# Patient Record
Sex: FEMALE | Race: WHITE | NOT HISPANIC OR LATINO | URBAN - METROPOLITAN AREA
[De-identification: names, ages, dates, MRNs, and addresses within clinical notes are randomized per-mention and may not be internally consistent; named-entity substitution may affect disease eponyms.]

---

## 2018-03-30 ENCOUNTER — OFFICE (OUTPATIENT)
Dept: URBAN - METROPOLITAN AREA CLINIC 66 | Facility: CLINIC | Age: 63
End: 2018-03-30

## 2018-03-30 VITALS
DIASTOLIC BLOOD PRESSURE: 73 MMHG | SYSTOLIC BLOOD PRESSURE: 121 MMHG | HEIGHT: 62 IN | HEART RATE: 77 BPM | WEIGHT: 111 LBS

## 2018-03-30 DIAGNOSIS — K58.0 IRRITABLE BOWEL SYNDROME WITH DIARRHEA: ICD-10-CM

## 2018-03-30 DIAGNOSIS — K86.1 OTHER CHRONIC PANCREATITIS: ICD-10-CM

## 2018-03-30 PROCEDURE — 99213 OFFICE O/P EST LOW 20 MIN: CPT

## 2018-07-13 ENCOUNTER — OFFICE (OUTPATIENT)
Dept: URBAN - METROPOLITAN AREA CLINIC 66 | Facility: CLINIC | Age: 63
End: 2018-07-13

## 2018-07-13 VITALS
SYSTOLIC BLOOD PRESSURE: 125 MMHG | DIASTOLIC BLOOD PRESSURE: 79 MMHG | HEART RATE: 68 BPM | WEIGHT: 110 LBS | HEIGHT: 62 IN

## 2018-07-13 DIAGNOSIS — K30 FUNCTIONAL DYSPEPSIA: ICD-10-CM

## 2018-07-13 DIAGNOSIS — K58.0 IRRITABLE BOWEL SYNDROME WITH DIARRHEA: ICD-10-CM

## 2018-07-13 DIAGNOSIS — F41.8 OTHER SPECIFIED ANXIETY DISORDERS: ICD-10-CM

## 2018-07-13 PROCEDURE — 99214 OFFICE O/P EST MOD 30 MIN: CPT

## 2018-08-01 ENCOUNTER — OFFICE (OUTPATIENT)
Dept: URBAN - METROPOLITAN AREA CLINIC 66 | Facility: CLINIC | Age: 63
End: 2018-08-01

## 2018-08-01 VITALS
DIASTOLIC BLOOD PRESSURE: 81 MMHG | WEIGHT: 110 LBS | SYSTOLIC BLOOD PRESSURE: 155 MMHG | HEART RATE: 78 BPM | HEIGHT: 62 IN

## 2018-08-01 DIAGNOSIS — K30 FUNCTIONAL DYSPEPSIA: ICD-10-CM

## 2018-08-01 DIAGNOSIS — F41.8 OTHER SPECIFIED ANXIETY DISORDERS: ICD-10-CM

## 2018-08-01 DIAGNOSIS — R11.0 NAUSEA: ICD-10-CM

## 2018-08-01 DIAGNOSIS — R10.9 UNSPECIFIED ABDOMINAL PAIN: ICD-10-CM

## 2018-08-01 PROCEDURE — 99214 OFFICE O/P EST MOD 30 MIN: CPT

## 2018-08-15 ENCOUNTER — AMBULATORY SURGICAL CENTER (OUTPATIENT)
Dept: URBAN - METROPOLITAN AREA SURGERY 20 | Facility: SURGERY | Age: 63
End: 2018-08-15
Payer: COMMERCIAL

## 2018-08-15 ENCOUNTER — OFFICE (OUTPATIENT)
Dept: URBAN - METROPOLITAN AREA PATHOLOGY 4 | Facility: PATHOLOGY | Age: 63
End: 2018-08-15

## 2018-08-15 VITALS — HEIGHT: 62 IN

## 2018-08-15 DIAGNOSIS — K21.0 GASTRO-ESOPHAGEAL REFLUX DISEASE WITH ESOPHAGITIS: ICD-10-CM

## 2018-08-15 DIAGNOSIS — K30 FUNCTIONAL DYSPEPSIA: ICD-10-CM

## 2018-08-15 DIAGNOSIS — R10.9 UNSPECIFIED ABDOMINAL PAIN: ICD-10-CM

## 2018-08-15 DIAGNOSIS — K44.9 DIAPHRAGMATIC HERNIA WITHOUT OBSTRUCTION OR GANGRENE: ICD-10-CM

## 2018-08-15 LAB
GI HISTOLOGY: A. SELECT: (no result)
GI HISTOLOGY: B. SELECT: (no result)
GI HISTOLOGY: PDF REPORT: (no result)

## 2018-08-15 PROCEDURE — 88305 TISSUE EXAM BY PATHOLOGIST: CPT

## 2018-08-15 PROCEDURE — 43239 EGD BIOPSY SINGLE/MULTIPLE: CPT

## 2018-08-15 NOTE — SERVICEHPINOTES
ELAINA CHING  is a  62  female   who presents today for a  EGD   for   the indications listed below. The updated Patient Profile was reviewed prior to the procedure, in conjunction with the Physical Exam, including medical conditions, surgical procedures, medications, allergies, family history and social history. See Physical Exam time stamp below for date and time of HPI completion.Pre-operatively, I reviewed the indication(s) for the procedure, the risks of the procedure [including but not limited to: unexpected bleeding possibly requiring hospitalization and/or unplanned repeat procedures, perforation possibly requiring surgical treatment, missed lesions and complications of sedation/MAC (also explained by anesthesia staff)]. I have evaluated the patient for risks associated with the planned anesthesia and the procedure to be performed and find the patient an acceptable candidate for IV sedation.Multiple opportunities were provided for any questions or concerns, and all questions were answered satisfactorily before any anesthesia was administered. We will proceed with the planned procedure.BR

## 2018-08-23 ENCOUNTER — OFFICE (OUTPATIENT)
Dept: URBAN - METROPOLITAN AREA CLINIC 66 | Facility: CLINIC | Age: 63
End: 2018-08-23

## 2018-08-23 VITALS
DIASTOLIC BLOOD PRESSURE: 81 MMHG | HEIGHT: 62 IN | HEART RATE: 85 BPM | SYSTOLIC BLOOD PRESSURE: 129 MMHG | WEIGHT: 110 LBS

## 2018-08-23 DIAGNOSIS — F41.8 OTHER SPECIFIED ANXIETY DISORDERS: ICD-10-CM

## 2018-08-23 DIAGNOSIS — K82.4 CHOLESTEROLOSIS OF GALLBLADDER: ICD-10-CM

## 2018-08-23 DIAGNOSIS — R10.9 UNSPECIFIED ABDOMINAL PAIN: ICD-10-CM

## 2018-08-23 DIAGNOSIS — K58.0 IRRITABLE BOWEL SYNDROME WITH DIARRHEA: ICD-10-CM

## 2018-08-23 PROCEDURE — 99214 OFFICE O/P EST MOD 30 MIN: CPT

## 2018-08-26 PROBLEM — K44.9 DIAPHRAGMATIC HERNIA WITHOUT OBSTRUCTION OR GANGRENE: Status: ACTIVE | Noted: 2018-08-15

## 2020-12-18 ENCOUNTER — OFFICE VISIT (OUTPATIENT)
Dept: FAMILY MEDICINE CLINIC | Facility: CLINIC | Age: 65
End: 2020-12-18

## 2020-12-18 VITALS
TEMPERATURE: 97.5 F | BODY MASS INDEX: 22.08 KG/M2 | HEIGHT: 62 IN | HEART RATE: 60 BPM | OXYGEN SATURATION: 99 % | WEIGHT: 120 LBS

## 2020-12-18 DIAGNOSIS — E78.2 MIXED HYPERLIPIDEMIA: ICD-10-CM

## 2020-12-18 DIAGNOSIS — R74.8 ELEVATED PANCREATIC ENZYME: ICD-10-CM

## 2020-12-18 DIAGNOSIS — I10 BENIGN ESSENTIAL HTN: Primary | ICD-10-CM

## 2020-12-18 DIAGNOSIS — F41.9 ANXIETY: ICD-10-CM

## 2020-12-18 DIAGNOSIS — E03.9 ACQUIRED HYPOTHYROIDISM: ICD-10-CM

## 2020-12-18 DIAGNOSIS — R60.9 DEPENDENT EDEMA: ICD-10-CM

## 2020-12-18 PROCEDURE — 99204 OFFICE O/P NEW MOD 45 MIN: CPT | Performed by: NURSE PRACTITIONER

## 2020-12-18 RX ORDER — MULTIPLE VITAMINS W/ MINERALS TAB 9MG-400MCG
1 TAB ORAL DAILY
COMMUNITY

## 2020-12-18 RX ORDER — ESCITALOPRAM OXALATE 10 MG/1
10 TABLET ORAL DAILY
COMMUNITY
Start: 2020-10-15 | End: 2020-12-18 | Stop reason: SDUPTHER

## 2020-12-18 RX ORDER — LISINOPRIL 5 MG/1
TABLET ORAL
COMMUNITY
Start: 2020-10-18 | End: 2020-12-18

## 2020-12-18 RX ORDER — LISINOPRIL 2.5 MG/1
2.5 TABLET ORAL DAILY
Qty: 90 TABLET | Refills: 1 | Status: SHIPPED | OUTPATIENT
Start: 2020-12-18 | End: 2021-04-15

## 2020-12-18 RX ORDER — CETIRIZINE HYDROCHLORIDE 10 MG/1
10 TABLET ORAL DAILY
COMMUNITY
End: 2021-05-05

## 2020-12-18 RX ORDER — LEVOTHYROXINE SODIUM 25 UG/1
25 TABLET ORAL EVERY MORNING
COMMUNITY
Start: 2020-10-18

## 2020-12-18 RX ORDER — ESCITALOPRAM OXALATE 10 MG/1
10 TABLET ORAL DAILY
Qty: 90 TABLET | Refills: 1 | Status: SHIPPED | OUTPATIENT
Start: 2020-12-18 | End: 2021-04-20 | Stop reason: SDUPTHER

## 2020-12-18 RX ORDER — LIOTHYRONINE SODIUM 5 UG/1
5 TABLET ORAL DAILY
COMMUNITY
Start: 2020-12-05

## 2020-12-18 RX ORDER — HYDROCHLOROTHIAZIDE 12.5 MG/1
12.5 CAPSULE, GELATIN COATED ORAL DAILY
COMMUNITY
End: 2022-02-14 | Stop reason: SDUPTHER

## 2020-12-18 RX ORDER — LORAZEPAM 0.5 MG/1
0.5 TABLET ORAL EVERY 8 HOURS PRN
COMMUNITY
End: 2020-12-18

## 2020-12-18 NOTE — PROGRESS NOTES
"Subjective   Lissy Fernandez is a 65 y.o. female.     Very pleasant patient here today for the first time needs new PCP  Her previous PCP Dr. Pang recently retired who she had seen for at least 40 years.  She has been doing well  Presently.  Healthy.  History of IBS food intolerances after consulting with a dietitian, she found a better dietary regimen and she has been doing quite well her weight has been down to 90 pounds, presently 120.  She avoids a list of food, and she is doing quite well presently she does not have true gluten intolerance celiac nonetheless she does not tolerate it and avoids it avoids beans etc.    Low thyroid    Hot flashes since    Just had labs        Surgical history  Ovaries and tubes removed approximately 2006 with some precancerous cells of her ovary she had no ovarian cancer but nonetheless she followed up with oncology for at least 5 years.  Still has her wound.  She had breast biopsies negative  Colonoscopy 2017 EGD 2018  Cholecystectomy 2018    SH  No tobacco, no drugs no alcohol  , 4 children, 9 grandkids       Family history  Sister bilateral breast cancer  No colon cancer             Pulse 60   Temp 97.5 °F (36.4 °C) (Temporal)   Ht 157.5 cm (62\")   Wt 54.4 kg (120 lb)   SpO2 99%   BMI 21.95 kg/m²       The following portions of the patient's history were reviewed and updated as appropriate: allergies, current medications, past family history, past medical history, past social history, past surgical history and problem list.    Review of Systems   Constitutional: Negative for chills, fatigue, fever and unexpected weight loss.   HENT: Negative.  Negative for trouble swallowing.    Eyes: Negative.    Respiratory: Negative for cough and shortness of breath.    Cardiovascular: Negative for chest pain, palpitations and leg swelling.   Gastrointestinal: Negative for abdominal pain and blood in stool.   Genitourinary: Negative.  Negative for pelvic pain. "   Musculoskeletal: Negative.    Skin: Negative.    Neurological: Negative.  Negative for dizziness, speech difficulty, weakness and confusion.   Psychiatric/Behavioral: Negative.        Objective   Physical Exam  Vitals signs reviewed.   Constitutional:       Appearance: Normal appearance. She is well-developed.   HENT:      Head: Normocephalic.      Right Ear: Tympanic membrane normal.      Left Ear: Tympanic membrane normal.      Mouth/Throat:      Pharynx: No oropharyngeal exudate.   Eyes:      Conjunctiva/sclera: Conjunctivae normal.      Pupils: Pupils are equal, round, and reactive to light.   Neck:      Musculoskeletal: Neck supple.      Vascular: No JVD.   Cardiovascular:      Rate and Rhythm: Normal rate and regular rhythm.      Heart sounds: Normal heart sounds. No murmur. No friction rub.   Pulmonary:      Effort: Pulmonary effort is normal. No respiratory distress.      Breath sounds: Normal breath sounds. No wheezing.   Abdominal:      General: Bowel sounds are normal. There is no distension.      Palpations: Abdomen is soft. There is no mass.      Tenderness: There is no abdominal tenderness. There is no guarding.      Hernia: No hernia is present.   Musculoskeletal: Normal range of motion.         General: No tenderness.   Lymphadenopathy:      Cervical: No cervical adenopathy.   Skin:     General: Skin is warm and dry.   Neurological:      Mental Status: She is alert and oriented to person, place, and time.      Sensory: No sensory deficit.      Coordination: Coordination normal.      Deep Tendon Reflexes: Reflexes normal.   Psychiatric:         Mood and Affect: Mood normal.         Behavior: Behavior normal.         Thought Content: Thought content normal.         Judgment: Judgment normal.           Assessment/Plan   Diagnoses and all orders for this visit:    1. Benign essential HTN (Primary)  Comments:  Mild    2. Dependent edema  -     CBC & Differential  -     Comprehensive Metabolic Panel  -      Lipid Panel With LDL / HDL Ratio  -     Urinalysis With Microscopic If Indicated (No Culture) - Urine, Clean Catch  -     Amylase  -     Lipase    3. Acquired hypothyroidism  -     CBC & Differential  -     Comprehensive Metabolic Panel  -     Lipid Panel With LDL / HDL Ratio  -     Urinalysis With Microscopic If Indicated (No Culture) - Urine, Clean Catch  -     Amylase  -     Lipase    4. Anxiety  -     CBC & Differential  -     Comprehensive Metabolic Panel  -     Lipid Panel With LDL / HDL Ratio  -     Urinalysis With Microscopic If Indicated (No Culture) - Urine, Clean Catch  -     Amylase  -     Lipase    5. Mixed hyperlipidemia  -     CBC & Differential  -     Comprehensive Metabolic Panel  -     Lipid Panel With LDL / HDL Ratio  -     Urinalysis With Microscopic If Indicated (No Culture) - Urine, Clean Catch  -     Amylase  -     Lipase    6. Elevated pancreatic enzyme  Comments:  Mild, probably no clinical significance will repeat  Orders:  -     CBC & Differential  -     Comprehensive Metabolic Panel  -     Lipid Panel With LDL / HDL Ratio  -     Urinalysis With Microscopic If Indicated (No Culture) - Urine, Clean Catch  -     Amylase  -     Lipase    Other orders  -     lisinopril (Zestril) 2.5 MG tablet; Take 1 tablet by mouth Daily.  Dispense: 90 tablet; Refill: 1  -     escitalopram (LEXAPRO) 10 MG tablet; Take 1 tablet by mouth Daily.  Dispense: 90 tablet; Refill: 1      Patient will continue present plan medications as written labs healthy diet regular exercise follow instructions below            There are no Patient Instructions on file for this visit.

## 2020-12-18 NOTE — PATIENT INSTRUCTIONS
Discharge instructions  Continue healthy diet regular exercise  Continue your special diet  Mostly vegetables  Mostly chicken fish 64 ounces of water a day      Continue present care  Hold thyroid medication x24 hours then resume levothyroxine 25 mcg daily but hold the liothyronine 5 mg, until your appointment Tuesday then they will set the dose for you thereafter

## 2020-12-30 ENCOUNTER — TELEPHONE (OUTPATIENT)
Dept: FAMILY MEDICINE CLINIC | Facility: CLINIC | Age: 65
End: 2020-12-30

## 2020-12-30 NOTE — TELEPHONE ENCOUNTER
I will be happy to look at those, I do not see them in the chart I am guessing the fax is still pending thank you

## 2020-12-30 NOTE — TELEPHONE ENCOUNTER
Pt has been called and advised you are not in office at this time and will return next week and you check your messages sporadically. She is calling [facility] to get labs faxed over.

## 2020-12-31 NOTE — TELEPHONE ENCOUNTER
Pt has been called and advised we are waiting on her blood work from [facility] and we will be more then happy to call her with results.

## 2021-01-07 ENCOUNTER — TELEPHONE (OUTPATIENT)
Dept: FAMILY MEDICINE CLINIC | Facility: CLINIC | Age: 66
End: 2021-01-07

## 2021-01-07 NOTE — TELEPHONE ENCOUNTER
Patient called in requesting the results from the lab work she had done.    Please call back to danie @ [phone]

## 2021-01-22 DIAGNOSIS — R74.8 ELEVATED PANCREATIC ENZYME: ICD-10-CM

## 2021-01-22 DIAGNOSIS — R74.8 SERUM AMYLASE ELEVATED: Primary | ICD-10-CM

## 2021-01-22 NOTE — TELEPHONE ENCOUNTER
Office note, for abdominal ultrasound      Discussed with patient her labs overall everything looks pretty good she has a very low 10-year cardiovascular risk around 7%    She has seen endocrinology, had a few palpitations they were mild  No racing heart shortness of breath syncope weakness dizziness etc. there are change in her thyroid medication and both her and the doctor think it is likely related    Should she have increased symptoms chest pain shortness of breath weakness emergency room any more frequent palpitations not resolving  She should have a Holter monitor and see cardiology      She has chronic mild elevation amylase most recently 175 she is without abdominal complaints does not abuse alcohol she had a gallbladder removed 2 years ago related to gallbladder complaints of abdominal pain and dyspepsia  This resolved after the surgery nonetheless she had a chronic elevated amylase, she is not sure if she had further evaluation or if she had an ultrasound or CAT scan.    Reviewed her medication list I will see anything that would elevate amylase chronically artificially  No tobacco or family history of pancreatic cancers    I think at the minimum we should check a ultrasound to make sure everything looks good with the pancreas and Duct    Offered gastroenterologist referral as well    Patient would like to start with the ultrasound I think is a good plan as well to rule out any ductal obstruction or pancreatic mass etc.      James Epley, APRN, FNP

## 2021-01-22 NOTE — TELEPHONE ENCOUNTER
Pt called checking on the status of this. We still have not received these results. I called [facility] again and they said they would fax them over today.

## 2021-01-22 NOTE — TELEPHONE ENCOUNTER
We finally received the lab results. I have routed them to James Epley. If Edgardo could review and then we call the patient. She has been trying to get these results for almost a month now.

## 2021-04-15 RX ORDER — LISINOPRIL 2.5 MG/1
TABLET ORAL
Qty: 90 TABLET | Refills: 1 | Status: SHIPPED | OUTPATIENT
Start: 2021-04-15 | End: 2021-06-11

## 2021-04-20 RX ORDER — ESCITALOPRAM OXALATE 10 MG/1
10 TABLET ORAL DAILY
Qty: 90 TABLET | Refills: 0 | Status: SHIPPED | OUTPATIENT
Start: 2021-04-20 | End: 2021-07-09

## 2021-04-20 NOTE — TELEPHONE ENCOUNTER
Caller: Lissy Fernandez    Relationship: Self    Best call back number: [phone]    Medication needed:   Requested Prescriptions     Pending Prescriptions Disp Refills   • escitalopram (LEXAPRO) 10 MG tablet 90 tablet 1     Sig: Take 1 tablet by mouth Daily.       When do you need the refill by: 02/22/21    What additional details did the patient provide when requesting the medication: THE PATIENT STATES THAT SHE HAS BOUT 5 DAYS OF THE PILLS AND WILL BE LEAVING [place] TO COME BACK TO [place] IN TWO DAYS AND WOULD LIKE T O HAVE THIS BY THIS Thursday OR Friday 02/22-23/21.     Does the patient have less than a 3 day supply:  [] Yes  [x] No    What is the patient's preferred pharmacy: [facility] [address] [place] [address] [phone] [facility] [phone]

## 2021-05-05 ENCOUNTER — APPOINTMENT (OUTPATIENT)
Dept: WOMENS IMAGING | Facility: HOSPITAL | Age: 66
End: 2021-05-05

## 2021-05-05 ENCOUNTER — PROCEDURE VISIT (OUTPATIENT)
Dept: OBSTETRICS AND GYNECOLOGY | Facility: CLINIC | Age: 66
End: 2021-05-05

## 2021-05-05 ENCOUNTER — OFFICE VISIT (OUTPATIENT)
Dept: OBSTETRICS AND GYNECOLOGY | Facility: CLINIC | Age: 66
End: 2021-05-05

## 2021-05-05 VITALS
SYSTOLIC BLOOD PRESSURE: 100 MMHG | WEIGHT: 118.8 LBS | HEIGHT: 62 IN | BODY MASS INDEX: 21.86 KG/M2 | DIASTOLIC BLOOD PRESSURE: 60 MMHG

## 2021-05-05 DIAGNOSIS — Z01.419 ENCOUNTER FOR GYNECOLOGICAL EXAMINATION WITHOUT ABNORMAL FINDING: Primary | ICD-10-CM

## 2021-05-05 DIAGNOSIS — D39.10 OVARIAN NEOPLASM WITH LOW MALIGNANT POTENTIAL: ICD-10-CM

## 2021-05-05 DIAGNOSIS — Z12.31 VISIT FOR SCREENING MAMMOGRAM: Primary | ICD-10-CM

## 2021-05-05 PROCEDURE — G0101 CA SCREEN;PELVIC/BREAST EXAM: HCPCS | Performed by: OBSTETRICS & GYNECOLOGY

## 2021-05-05 PROCEDURE — 77063 BREAST TOMOSYNTHESIS BI: CPT | Performed by: OBSTETRICS & GYNECOLOGY

## 2021-05-05 PROCEDURE — 77063 BREAST TOMOSYNTHESIS BI: CPT | Performed by: RADIOLOGY

## 2021-05-05 PROCEDURE — 77067 SCR MAMMO BI INCL CAD: CPT | Performed by: OBSTETRICS & GYNECOLOGY

## 2021-05-05 PROCEDURE — 77067 SCR MAMMO BI INCL CAD: CPT | Performed by: RADIOLOGY

## 2021-05-05 RX ORDER — IPRATROPIUM BROMIDE 42 UG/1
1 SPRAY, METERED NASAL AS NEEDED
COMMUNITY
Start: 2021-02-22

## 2021-05-05 NOTE — PROGRESS NOTES
GYN Annual Exam     CC- Here for annual exam.     Lissy Fernandez is a 65 y.o. female who presents for annual well woman exam. She IS/ IS NOT: is menopausal.  She is experiencing and/or reports none.  She denies postmenopausal vaginal bleeding.  She denies abdominal pain, diarrhea and cough.  Today, she wishes to specifically address nothing in particular.  I explained to her that current ACOG guidelines state that screening Pap smears are no longer recommended after the age of 65, nor after hysterectomy for benign reasons.  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  She has had negative CA-125's through her last visit.  She is a former patient of mine who I last saw in 2018.  Her CA-125 was 6.9 on 2018.    OB History        2    Para   2    Term                AB        Living           SAB        TAB        Ectopic        Molar        Multiple        Live Births                    Current contraception: post menopausal status  History of abnormal Pap smear: no  Family history of uterine, colon or ovarian cancer: no  History of abnormal mammogram: no  Family history of breast cancer: yes - Sister  Last Pap :   Last mammogram: 2019  Last colonoscopy: In the past 10 years  Last DEXA: Cannot locate      Past Medical History:   Diagnosis Date   • Abnormal Pap smear of cervix    • Cervical dysplasia    • HTN (hypertension)    • Hypothyroidism    • Melanoma (CMS/HCC)        Past Surgical History:   Procedure Laterality Date   • BILATERAL OOPHORECTOMY     • CHOLECYSTECTOMY     • WISDOM TOOTH EXTRACTION           Current Outpatient Medications:   •  escitalopram (LEXAPRO) 10 MG tablet, Take 1 tablet by mouth Daily., Disp: 90 tablet, Rfl: 0  •  Euthyrox 25 MCG tablet, Take 25 mcg by mouth Every Morning., Disp: , Rfl:   •  ipratropium (ATROVENT) 0.06 % nasal spray, 1 spray As Needed., Disp: , Rfl:   •  liothyronine (CYTOMEL) 5 MCG tablet,  "Take 5 mcg by mouth Daily., Disp: , Rfl:   •  lisinopril (PRINIVIL,ZESTRIL) 2.5 MG tablet, Take 1 tablet by mouth once daily, Disp: 90 tablet, Rfl: 1  •  multivitamin with minerals tablet tablet, Take 1 tablet by mouth Daily., Disp: , Rfl:   •  hydroCHLOROthiazide (MICROZIDE) 12.5 MG capsule, Take 12.5 mg by mouth Daily., Disp: , Rfl:     Allergies   Allergen Reactions   • Amoxicillin-Pot Clavulanate Diarrhea and Nausea And Vomiting   • Azithromycin Diarrhea and GI Intolerance   • Levofloxacin Diarrhea, Nausea And Vomiting and GI Intolerance   • Metronidazole GI Intolerance       Social History     Tobacco Use   • Smoking status: Never Smoker   • Smokeless tobacco: Never Used   Vaping Use   • Vaping Use: Never used   Substance Use Topics   • Alcohol use: Never   • Drug use: Never       Family History   Problem Relation Age of Onset   • Hypertension Father    • Breast cancer Sister    • Breast cancer Other        Review of Systems   Constitutional: Negative for fever and unexpected weight loss.   Respiratory: Negative for cough and shortness of breath.    Genitourinary: Negative for decreased urine volume, pelvic pain, urinary incontinence and vaginal bleeding.   Patient reports that she is not currently experiencing any symptoms of urinary incontinence.      /60   Ht 157.5 cm (62.01\")   Wt 53.9 kg (118 lb 12.8 oz)   LMP  (LMP Unknown)   BMI 21.72 kg/m²     Physical Exam  Constitutional:       Appearance: She is normal weight.   Genitourinary:      Pelvic exam was performed with patient in the lithotomy position.      Vulva, inguinal canal, urethra and bladder normal.      Bladder is not tender.      No lesions in the vagina.      No cervical discharge, lesion or bleeding.      Uterus is mobile.      Uterus is midaxial and regular.      Right adnexa absent.      Left adnexa absent.   Neck:      Thyroid: No thyroid mass or thyromegaly.   Chest:      Breasts:         Right: No mass, nipple discharge, skin " change or tenderness.         Left: No mass, nipple discharge, skin change or tenderness.   Abdominal:      General: Abdomen is flat. There are no signs of injury.      Palpations: There is no mass.      Tenderness: There is no abdominal tenderness.   Neurological:      Mental Status: She is alert.   Vitals reviewed.              Assessment     1) GYN annual well woman exam.   2) history of bilateral salpingo-oophorectomy for ovarian tumor of low malignant potential.  We will get CA-125 today.  He is doing well with no evidence of any kind of recurrent disease.     Plan     1) Breast Health - Clinical breast exam & mammogram reviewed specifically American Cancer Society recommendations for screening specific to her, and Self breast awareness monthly  2) Pap -done today  3) Smoking status-negative  4) Colon health - screening colonoscopy recommended if not up to date  5) Bone health - Weight bearing exercise, dietary calcium recommendations and vitamin D reviewed.   6) Encouraged to be wary of information obtained via social media and internet based on source and search.   7) Follow up prn and one year      Allan Lopez MD   5/5/2021  11:50 EDT

## 2021-05-06 LAB — CANCER AG125 SERPL-ACNC: 11.8 U/ML (ref 0–38.1)

## 2021-05-07 LAB
CONV .: NORMAL
CYTOLOGIST CVX/VAG CYTO: NORMAL
CYTOLOGY CVX/VAG DOC CYTO: NORMAL
CYTOLOGY CVX/VAG DOC THIN PREP: NORMAL
DX ICD CODE: NORMAL
HIV 1 & 2 AB SER-IMP: NORMAL
OTHER STN SPEC: NORMAL
STAT OF ADQ CVX/VAG CYTO-IMP: NORMAL

## 2021-06-03 ENCOUNTER — OFFICE (OUTPATIENT)
Dept: URBAN - METROPOLITAN AREA CLINIC 94 | Facility: CLINIC | Age: 66
End: 2021-06-03

## 2021-06-03 VITALS
DIASTOLIC BLOOD PRESSURE: 66 MMHG | TEMPERATURE: 97.3 F | SYSTOLIC BLOOD PRESSURE: 118 MMHG | HEIGHT: 62 IN | WEIGHT: 120 LBS

## 2021-06-03 DIAGNOSIS — Z12.11 ENCOUNTER FOR SCREENING FOR MALIGNANT NEOPLASM OF COLON: ICD-10-CM

## 2021-06-03 DIAGNOSIS — K58.0 IRRITABLE BOWEL SYNDROME WITH DIARRHEA: ICD-10-CM

## 2021-06-03 DIAGNOSIS — R74.8 ABNORMAL LEVELS OF OTHER SERUM ENZYMES: ICD-10-CM

## 2021-06-03 DIAGNOSIS — R10.9 UNSPECIFIED ABDOMINAL PAIN: ICD-10-CM

## 2021-06-03 PROCEDURE — 99213 OFFICE O/P EST LOW 20 MIN: CPT | Performed by: INTERNAL MEDICINE

## 2021-06-03 RX ORDER — DICYCLOMINE HYDROCHLORIDE 10 MG/1
30 CAPSULE ORAL
Qty: 90 | Refills: 5 | Status: ACTIVE
Start: 2021-06-03

## 2021-06-11 RX ORDER — LISINOPRIL 2.5 MG/1
TABLET ORAL
Qty: 90 TABLET | Refills: 0 | Status: SHIPPED | OUTPATIENT
Start: 2021-06-11 | End: 2021-06-15

## 2021-06-15 ENCOUNTER — OFFICE VISIT (OUTPATIENT)
Dept: FAMILY MEDICINE CLINIC | Facility: CLINIC | Age: 66
End: 2021-06-15

## 2021-06-15 ENCOUNTER — LAB (OUTPATIENT)
Dept: LAB | Facility: HOSPITAL | Age: 66
End: 2021-06-15

## 2021-06-15 VITALS
HEART RATE: 63 BPM | SYSTOLIC BLOOD PRESSURE: 108 MMHG | DIASTOLIC BLOOD PRESSURE: 70 MMHG | TEMPERATURE: 97.1 F | WEIGHT: 119.1 LBS | HEIGHT: 62 IN | OXYGEN SATURATION: 96 % | BODY MASS INDEX: 21.92 KG/M2

## 2021-06-15 DIAGNOSIS — C44.300 SKIN CANCER OF FACE: ICD-10-CM

## 2021-06-15 DIAGNOSIS — H91.92 HEARING LOSS OF LEFT EAR, UNSPECIFIED HEARING LOSS TYPE: ICD-10-CM

## 2021-06-15 DIAGNOSIS — R74.8 ELEVATED AMYLASE: ICD-10-CM

## 2021-06-15 DIAGNOSIS — H69.80 DYSFUNCTION OF EUSTACHIAN TUBE, UNSPECIFIED LATERALITY: ICD-10-CM

## 2021-06-15 DIAGNOSIS — H69.83 EUSTACHIAN TUBE DYSFUNCTION, BILATERAL: ICD-10-CM

## 2021-06-15 DIAGNOSIS — E03.8 OTHER SPECIFIED HYPOTHYROIDISM: ICD-10-CM

## 2021-06-15 DIAGNOSIS — I10 ESSENTIAL HYPERTENSION: Primary | ICD-10-CM

## 2021-06-15 PROBLEM — H69.93 EUSTACHIAN TUBE DYSFUNCTION, BILATERAL: Status: ACTIVE | Noted: 2021-06-15

## 2021-06-15 LAB
ALBUMIN SERPL-MCNC: 4.3 G/DL (ref 3.5–5.2)
ALBUMIN/GLOB SERPL: 1.5 G/DL
ALP SERPL-CCNC: 89 U/L (ref 39–117)
ALT SERPL W P-5'-P-CCNC: 15 U/L (ref 1–33)
AMYLASE SERPL-CCNC: 174 U/L (ref 28–100)
ANION GAP SERPL CALCULATED.3IONS-SCNC: 10.9 MMOL/L (ref 5–15)
AST SERPL-CCNC: 24 U/L (ref 1–32)
BASOPHILS # BLD AUTO: 0.04 10*3/MM3 (ref 0–0.2)
BASOPHILS NFR BLD AUTO: 0.6 % (ref 0–1.5)
BILIRUB SERPL-MCNC: 0.5 MG/DL (ref 0–1.2)
BUN SERPL-MCNC: 15 MG/DL (ref 8–23)
BUN/CREAT SERPL: 16.1 (ref 7–25)
CALCIUM SPEC-SCNC: 9.6 MG/DL (ref 8.6–10.5)
CHLORIDE SERPL-SCNC: 103 MMOL/L (ref 98–107)
CHOLEST SERPL-MCNC: 236 MG/DL (ref 0–200)
CO2 SERPL-SCNC: 26.1 MMOL/L (ref 22–29)
CREAT SERPL-MCNC: 0.93 MG/DL (ref 0.57–1)
DEPRECATED RDW RBC AUTO: 43.9 FL (ref 37–54)
EOSINOPHIL # BLD AUTO: 0.19 10*3/MM3 (ref 0–0.4)
EOSINOPHIL NFR BLD AUTO: 2.9 % (ref 0.3–6.2)
ERYTHROCYTE [DISTWIDTH] IN BLOOD BY AUTOMATED COUNT: 12.9 % (ref 12.3–15.4)
GFR SERPL CREATININE-BSD FRML MDRD: 60 ML/MIN/1.73
GLOBULIN UR ELPH-MCNC: 2.8 GM/DL
GLUCOSE SERPL-MCNC: 83 MG/DL (ref 65–99)
HCT VFR BLD AUTO: 43.5 % (ref 34–46.6)
HDLC SERPL-MCNC: 75 MG/DL (ref 40–60)
HGB BLD-MCNC: 13.9 G/DL (ref 12–15.9)
IMM GRANULOCYTES # BLD AUTO: 0.01 10*3/MM3 (ref 0–0.05)
IMM GRANULOCYTES NFR BLD AUTO: 0.2 % (ref 0–0.5)
LDLC SERPL CALC-MCNC: 145 MG/DL (ref 0–100)
LDLC/HDLC SERPL: 1.9 {RATIO}
LYMPHOCYTES # BLD AUTO: 1.97 10*3/MM3 (ref 0.7–3.1)
LYMPHOCYTES NFR BLD AUTO: 29.7 % (ref 19.6–45.3)
MCH RBC QN AUTO: 29.8 PG (ref 26.6–33)
MCHC RBC AUTO-ENTMCNC: 32 G/DL (ref 31.5–35.7)
MCV RBC AUTO: 93.1 FL (ref 79–97)
MONOCYTES # BLD AUTO: 0.52 10*3/MM3 (ref 0.1–0.9)
MONOCYTES NFR BLD AUTO: 7.8 % (ref 5–12)
NEUTROPHILS NFR BLD AUTO: 3.91 10*3/MM3 (ref 1.7–7)
NEUTROPHILS NFR BLD AUTO: 58.8 % (ref 42.7–76)
NRBC BLD AUTO-RTO: 0 /100 WBC (ref 0–0.2)
PLATELET # BLD AUTO: 285 10*3/MM3 (ref 140–450)
PMV BLD AUTO: 10.1 FL (ref 6–12)
POTASSIUM SERPL-SCNC: 3.9 MMOL/L (ref 3.5–5.2)
PROT SERPL-MCNC: 7.1 G/DL (ref 6–8.5)
RBC # BLD AUTO: 4.67 10*6/MM3 (ref 3.77–5.28)
SODIUM SERPL-SCNC: 140 MMOL/L (ref 136–145)
TRIGL SERPL-MCNC: 91 MG/DL (ref 0–150)
VLDLC SERPL-MCNC: 16 MG/DL (ref 5–40)
WBC # BLD AUTO: 6.64 10*3/MM3 (ref 3.4–10.8)

## 2021-06-15 PROCEDURE — 99213 OFFICE O/P EST LOW 20 MIN: CPT | Performed by: NURSE PRACTITIONER

## 2021-06-15 PROCEDURE — 80061 LIPID PANEL: CPT | Performed by: NURSE PRACTITIONER

## 2021-06-15 PROCEDURE — 82150 ASSAY OF AMYLASE: CPT | Performed by: NURSE PRACTITIONER

## 2021-06-15 PROCEDURE — 80053 COMPREHEN METABOLIC PANEL: CPT | Performed by: NURSE PRACTITIONER

## 2021-06-15 PROCEDURE — 85025 COMPLETE CBC W/AUTO DIFF WBC: CPT | Performed by: NURSE PRACTITIONER

## 2021-06-15 PROCEDURE — 36415 COLL VENOUS BLD VENIPUNCTURE: CPT | Performed by: NURSE PRACTITIONER

## 2021-06-15 RX ORDER — LEVOTHYROXINE SODIUM 0.03 MG/1
25 TABLET ORAL DAILY
COMMUNITY

## 2021-06-15 RX ORDER — FLUTICASONE PROPIONATE 50 MCG
2 SPRAY, SUSPENSION (ML) NASAL DAILY
Qty: 18.2 ML | Refills: 11 | Status: SHIPPED | OUTPATIENT
Start: 2021-06-15

## 2021-06-15 NOTE — PATIENT INSTRUCTIONS
Discharge instructions    Flonase 2 sprays each nostril nightly  Trial of Zyrtec 10 mg at bedtime    Try this for 8 to 12 weeks  If needed add Astelin antihistamine nasal spray or over-the-counter [email or web address] nasal chrome for nasal allergies left    If your symptoms have not resolved over the next 8 to 12 weeks after giving this another time then see ENT for further evaluation simply call for referral    Left hip should you have frequent or chronic pain you need a work-up including x-rays otherwise just good stretching of your pelvis, buttocks,  And lower trunk region including your hip daily    Exercise joint sparing such as swimming cycling are excellent    Discontinue lisinopril  Check blood pressure weekly should average less than 130/80 and greater than 110/77 some blood pressure readings in a couple weeks    For occasional dependent edema simply elevate legs when sitting decrease sodium in your diet breads and pasta and compression socks on in the morning off at night 15-19 or similar something coffee during winter

## 2021-06-15 NOTE — PROGRESS NOTES
"Chief Complaint  Hypertension    Subjective          Lissy Fernandez presents to [facility]  Very pleasant patient here follow-up of hypertension essential patient's blood pressures been running pretty good 108/70 here she checks at home with a wrist cuff, usually controlled but sometimes little high in the 130s, she is having no chest pain shortness of breath no exertional chest pain.  She is down to 2.5 mg lisinopril will likely discontinue this today.  Hypothyroid acquired she is up-to-date with endocrinology her medications been adjusted.  Sometimes her friends notices some edema or swelling around her ankle she has no calf pain no chest pain shortness of breath no history of DVT PE, no acute problems otherwise no history of CHF.      Chronically left hearing loss deafness left ear with chronic tinnitus work-up 20 years ago negative MRI.  No change.  She does have some pressure issues makes the tinnitus a little worse at night, sometimes some pressure in her right ear as well.  She takes Allegra for allergies, but sometimes takes Benadryl  As a change in nothing for at least a certain extent helps her sleep little bit better through the tendinitis as well as sometimes the tendinitis is worse due to the pressure issues of her sinus.                Hypertension        Objective   Vital Signs:   /70   Pulse 63   Temp 97.1 °F (36.2 °C) (Temporal)   Ht 157.5 cm (62.01\")   Wt 54 kg (119 lb 1.6 oz)   SpO2 96%   BMI 21.78 kg/m²     Physical Exam  Vitals reviewed.   Constitutional:       Appearance: She is well-developed.   HENT:      Head: Normocephalic.      Nose: Nose normal.   Eyes:      General: No scleral icterus.     Conjunctiva/sclera: Conjunctivae normal.      Pupils: Pupils are equal, round, and reactive to light.   Neck:      Thyroid: No thyromegaly.      Vascular: No JVD.   Cardiovascular:      Rate and Rhythm: Normal rate and regular rhythm.      Heart sounds: " Normal heart sounds. No murmur heard.   No friction rub. No gallop.    Pulmonary:      Effort: Pulmonary effort is normal. No respiratory distress.      Breath sounds: Normal breath sounds. No stridor. No wheezing or rales.   Abdominal:      General: Bowel sounds are normal. There is no distension.      Palpations: Abdomen is soft.      Tenderness: There is no abdominal tenderness.      Comments: No hepatosplenomegaly, no ascites,   Musculoskeletal:         General: No tenderness.      Cervical back: Neck supple.   Lymphadenopathy:      Cervical: No cervical adenopathy.   Skin:     General: Skin is warm and dry.      Findings: No erythema or rash.   Neurological:      Mental Status: She is alert and oriented to person, place, and time.      Deep Tendon Reflexes: Reflexes are normal and symmetric.   Psychiatric:         Behavior: Behavior normal.         Thought Content: Thought content normal.         Judgment: Judgment normal.        Result Review :                 Assessment and Plan    Diagnoses and all orders for this visit:    1. Essential hypertension (Primary)  -     Comprehensive Metabolic Panel  -     CBC & Differential  -     Lipid Panel With LDL / HDL Ratio  -     Urinalysis With Microscopic If Indicated (No Culture) - Urine, Clean Catch  -     Amylase    2. Dysfunction of Eustachian tube, unspecified laterality  -     Comprehensive Metabolic Panel  -     CBC & Differential  -     Lipid Panel With LDL / HDL Ratio  -     Urinalysis With Microscopic If Indicated (No Culture) - Urine, Clean Catch  -     Amylase    3. Eustachian tube dysfunction, bilateral  -     Comprehensive Metabolic Panel  -     CBC & Differential  -     Lipid Panel With LDL / HDL Ratio  -     Urinalysis With Microscopic If Indicated (No Culture) - Urine, Clean Catch  -     Amylase    4. Hearing loss of left ear, unspecified hearing loss type  Comments:  Chronic no change greater than 20 years previous MRI work-up ENT many years ago  chronic tinnitus  Orders:  -     Comprehensive Metabolic Panel  -     CBC & Differential  -     Lipid Panel With LDL / HDL Ratio  -     Urinalysis With Microscopic If Indicated (No Culture) - Urine, Clean Catch  -     Amylase    5. Other specified hypothyroidism  -     Comprehensive Metabolic Panel  -     CBC & Differential  -     Lipid Panel With LDL / HDL Ratio  -     Urinalysis With Microscopic If Indicated (No Culture) - Urine, Clean Catch  -     Amylase    6. Elevated amylase  -     Comprehensive Metabolic Panel  -     CBC & Differential  -     Lipid Panel With LDL / HDL Ratio  -     Urinalysis With Microscopic If Indicated (No Culture) - Urine, Clean Catch  -     Amylase    Other orders  -     fluticasone (Flonase) 50 MCG/ACT nasal spray; 2 sprays into the nostril(s) as directed by provider Daily. May substitute any covered thank you  Dispense: 18.2 mL; Refill: 11        Follow Up   No follow-ups on file.  Patient was given instructions and counseling regarding her condition or for health maintenance advice. Please see specific information pulled into the AVS if appropriate.       Blood pressure is controlled presently and I think discontinue lisinopril  She will monitor blood pressure at home and have her blood pressure cuff checked left wrist not always the most accurate    No new hearing loss left ear    She does have eustachian tube dysfunction symptoms of this does not clear up she will see ENT and call me    She has some intermittent left hip issues but normal exam today no chronic pain no history of malignancy except for some basal cell on her face    Exam looks essentially normal upper nose between her eyebrows no new lesions she will follow-up with dermatologist should she have a new area of concern should see dermatology promptly

## 2021-06-28 ENCOUNTER — TELEPHONE (OUTPATIENT)
Dept: FAMILY MEDICINE CLINIC | Facility: CLINIC | Age: 66
End: 2021-06-28

## 2021-06-28 NOTE — TELEPHONE ENCOUNTER
Caller: Lissy Fernandez    Relationship: Self    Best call back number: [phone]     Caller requesting test results:     What test was performed: LABS    When was the test performed: LAST WEEK    Where was the test performed: DOWN STAIRS    Additional notes: PATIENT CALLING WANTING TO KNOW IF THE RESULTS ARE IN

## 2021-07-09 RX ORDER — ESCITALOPRAM OXALATE 10 MG/1
TABLET ORAL
Qty: 90 TABLET | Refills: 0 | Status: SHIPPED | OUTPATIENT
Start: 2021-07-09 | End: 2021-10-13

## 2021-08-18 ENCOUNTER — TELEPHONE (OUTPATIENT)
Dept: FAMILY MEDICINE CLINIC | Facility: CLINIC | Age: 66
End: 2021-08-18

## 2021-08-18 RX ORDER — LISINOPRIL 2.5 MG/1
2.5 TABLET ORAL DAILY
Qty: 30 TABLET | Refills: 5 | Status: SHIPPED | OUTPATIENT
Start: 2021-08-18 | End: 2022-02-09 | Stop reason: SDUPTHER

## 2021-09-17 ENCOUNTER — TELEPHONE (OUTPATIENT)
Dept: FAMILY MEDICINE CLINIC | Facility: CLINIC | Age: 66
End: 2021-09-17

## 2021-09-17 NOTE — TELEPHONE ENCOUNTER
PATIENT JUST GOT OVER COVID AND TWO DAYS AGO SHE STARTED HAVE BLACK STOOLS. IS WANTING TO KNOW IF RELATED TO COVID.    PLEASE ADVISE  [phone]

## 2021-09-17 NOTE — TELEPHONE ENCOUNTER
"Patient was seen at a pop up site and tested for COVID. The COVID test came back positive, and the provider there said \"I can treat you\" She was prescribed Ivermectin for 3 days. Patient know has dark stool. She has been advised to go to the ER today.    "

## 2021-10-13 RX ORDER — ESCITALOPRAM OXALATE 10 MG/1
TABLET ORAL
Qty: 90 TABLET | Refills: 0 | Status: SHIPPED | OUTPATIENT
Start: 2021-10-13 | End: 2021-12-09

## 2021-10-13 NOTE — TELEPHONE ENCOUNTER
Rx Refill Note  Requested Prescriptions     Pending Prescriptions Disp Refills   • escitalopram (LEXAPRO) 10 MG tablet [Pharmacy Med Name: Escitalopram Oxalate 10 MG Oral Tablet] 90 tablet 0     Sig: Take 1 tablet by mouth once daily      Last office visit with prescribing clinician: 6/15/2021      Next office visit with prescribing clinician: 12/13/2021            Lidia Freeman Rep  10/13/21, 10:23 EDT

## 2021-11-26 NOTE — TELEPHONE ENCOUNTER
Caller: Lissy Fernandez    Relationship: Self    Best call back number: [phone]    What medication are you requesting: LISINOPRIL 5 MG     What are your current symptoms: BLOOD PRESSURE IS GOING BACK UP- 140/88     Have you had these symptoms before:    [x] Yes  [] No    Have you been treated for these symptoms before:   [x] Yes  [] No    If a prescription is needed, what is your preferred pharmacy and phone number: [address] [phone] [facility] [phone]      Additional notes:          
Attempted to contact patient back. No     Hub please inform patient if call back:  Edgardo sent in lisinopril 2.5 mg daily  Have her try this dose first for couple weeks. If not controlled she can increase it to 5 mg and let me know then I will rewrite her prescription if need be thank you  
I sent in lisinopril 2.5 mg daily  Have her try this dose first for couple weeks if not controlled she can increase it to 5 mg and let me know then I will rewrite her prescription if need be thank you      
No

## 2021-12-09 RX ORDER — ESCITALOPRAM OXALATE 10 MG/1
TABLET ORAL
Qty: 90 TABLET | Refills: 0 | Status: SHIPPED | OUTPATIENT
Start: 2021-12-09 | End: 2021-12-10 | Stop reason: SDUPTHER

## 2021-12-10 ENCOUNTER — OFFICE VISIT (OUTPATIENT)
Dept: FAMILY MEDICINE CLINIC | Facility: CLINIC | Age: 66
End: 2021-12-10

## 2021-12-10 VITALS
WEIGHT: 118 LBS | DIASTOLIC BLOOD PRESSURE: 79 MMHG | HEART RATE: 70 BPM | BODY MASS INDEX: 21.71 KG/M2 | SYSTOLIC BLOOD PRESSURE: 126 MMHG | TEMPERATURE: 96.2 F | OXYGEN SATURATION: 100 % | HEIGHT: 62 IN

## 2021-12-10 DIAGNOSIS — R31.9 HEMATURIA, UNSPECIFIED TYPE: Primary | ICD-10-CM

## 2021-12-10 DIAGNOSIS — I10 ESSENTIAL HYPERTENSION: ICD-10-CM

## 2021-12-10 DIAGNOSIS — M54.42 ACUTE MIDLINE LOW BACK PAIN WITH BILATERAL SCIATICA: ICD-10-CM

## 2021-12-10 DIAGNOSIS — L65.9 HAIR THINNING: ICD-10-CM

## 2021-12-10 DIAGNOSIS — M81.0 OSTEOPOROSIS, UNSPECIFIED OSTEOPOROSIS TYPE, UNSPECIFIED PATHOLOGICAL FRACTURE PRESENCE: ICD-10-CM

## 2021-12-10 DIAGNOSIS — M54.41 ACUTE MIDLINE LOW BACK PAIN WITH BILATERAL SCIATICA: ICD-10-CM

## 2021-12-10 DIAGNOSIS — Z00.00 HEALTH MAINTENANCE EXAMINATION: ICD-10-CM

## 2021-12-10 DIAGNOSIS — E03.9 ACQUIRED HYPOTHYROIDISM: ICD-10-CM

## 2021-12-10 LAB
BILIRUB BLD-MCNC: NEGATIVE MG/DL
CLARITY, POC: CLEAR
COLOR UR: YELLOW
EXPIRATION DATE: ABNORMAL
GLUCOSE UR STRIP-MCNC: NEGATIVE MG/DL
KETONES UR QL: NEGATIVE
LEUKOCYTE EST, POC: ABNORMAL
Lab: ABNORMAL
NITRITE UR-MCNC: NEGATIVE MG/ML
PH UR: 5.5 [PH] (ref 5–8)
PROT UR STRIP-MCNC: NEGATIVE MG/DL
RBC # UR STRIP: NEGATIVE /UL
SP GR UR: 1.01 (ref 1–1.03)
UROBILINOGEN UR QL: NORMAL

## 2021-12-10 PROCEDURE — 81003 URINALYSIS AUTO W/O SCOPE: CPT | Performed by: NURSE PRACTITIONER

## 2021-12-10 PROCEDURE — 99214 OFFICE O/P EST MOD 30 MIN: CPT | Performed by: NURSE PRACTITIONER

## 2021-12-10 PROCEDURE — 72100 X-RAY EXAM L-S SPINE 2/3 VWS: CPT | Performed by: NURSE PRACTITIONER

## 2021-12-10 RX ORDER — ESCITALOPRAM OXALATE 10 MG/1
10 TABLET ORAL DAILY
Qty: 90 TABLET | Refills: 1 | Status: SHIPPED | OUTPATIENT
Start: 2021-12-10

## 2021-12-10 NOTE — PATIENT INSTRUCTIONS
Discharge instructions    Walking for healthy bones, we will see where your calcium as needed guidance for osteoporosis you may be a candidate for Prolia?    DEXA scan every 2 years,    We probably should see a dermatologist for your hair but for now let us see what your labs are    Your left middle finger sounds like it could be a trigger finger or a small cyst sometimes on the extensor Aflaxen tendons although were not sure here so when you are ready see hand specialist    Exercises daily for your back, work around the pain, if is not improving after 6 weeks of exercise see me a message at that time he probably should see a orthopedist spine specialist to work-up your back in [place] as you will need an MRI      Tylenol 650 extra strength twice a day if need be for discomfort  We may need to try you with a steroid pack if your symptoms increased if you have severe pain fever chills weakness bowel or bladder incontinence or retention saddlebag paresthesias emergency room if your pain is not improving as above we will get MRI,      Should you get Covid symptoms or viral symptoms check early and repeat test due to potential for false negatives so I can offer you antibodies and possibly antivirals before you feel too sick

## 2021-12-10 NOTE — PROGRESS NOTES
"  Chief Complaint  Hypertension (6 mth f/o ), Back Pain, Hypothyroidism, Anxiety, and Depression    Subjective          Lissy Fernandez presents to [facility]    Pleasant patient here today follow-up essential hypertension, presently controlled she is compliant with her medication, hypothyroid she takes replacement therapy.  She has been having some back pain low back pain bilateral more so on the right sometimes radiates to her hip and worse in the mornings, she had no associated fever no nausea vomiting diaphoresis no dysuria frequency urgency no fevers or unexplained weight loss.  No associated pelvic pain.  We have a urinalysis here there is trace white blood cells, she has no recurrent UTIs.      In addition she is also losing some hair, as well        In all with anxiety depression    Recent scan shows osteoporosis DEXA scan,      She has had some pain in left middle finger when she extends and sometimes it feels like he gets stuck sound suspicious for may be trigger finger this does not evaluate today she will need to see hand specialist okay.      Objective   Vital Signs:   /79   Pulse 70   Temp 96.2 °F (35.7 °C)   Ht 157.5 cm (62\")   Wt 53.5 kg (118 lb) Comment: pt reported  SpO2 100%   BMI 21.58 kg/m²     Physical Exam  Vitals reviewed.   Constitutional:       Appearance: She is well-developed.   HENT:      Head: Normocephalic.      Nose: Nose normal.   Eyes:      General: No scleral icterus.     Conjunctiva/sclera: Conjunctivae normal.      Pupils: Pupils are equal, round, and reactive to light.   Neck:      Thyroid: No thyromegaly.      Vascular: No JVD.   Cardiovascular:      Rate and Rhythm: Normal rate and regular rhythm.      Heart sounds: Normal heart sounds. No murmur heard.  No friction rub. No gallop.    Pulmonary:      Effort: Pulmonary effort is normal. No respiratory distress.      Breath sounds: Normal breath sounds. No stridor. No wheezing or " rales.   Abdominal:      General: Bowel sounds are normal. There is no distension.      Palpations: Abdomen is soft.      Tenderness: There is no abdominal tenderness.      Comments: No hepatosplenomegaly, no ascites,   Musculoskeletal:         General: No swelling, tenderness, deformity or signs of injury. Normal range of motion.      Cervical back: Neck supple.      Right lower leg: No edema.      Left lower leg: No edema.   Lymphadenopathy:      Cervical: No cervical adenopathy.   Skin:     General: Skin is warm and dry.      Findings: No erythema or rash.   Neurological:      Mental Status: She is alert and oriented to person, place, and time.      Deep Tendon Reflexes: Reflexes are normal and symmetric.   Psychiatric:         Behavior: Behavior normal.         Thought Content: Thought content normal.         Judgment: Judgment normal.        Result Review :                 Assessment and Plan    Diagnoses and all orders for this visit:    1. Hematuria, unspecified type (Primary)  -     POC Urinalysis Dipstick, Automated  -     Urine Culture - Urine, Urine, Clean Catch  -     CBC & Differential  -     Comprehensive Metabolic Panel  -     Lipid Panel With LDL / HDL Ratio  -     TSH  -     Urinalysis With Microscopic If Indicated (No Culture) - Urine, Clean Catch  -     Ferritin  -     DYLON With / DsDNA, RNP, Sjogrens A / B, Vazquez  -     Vitamin D 25 Hydroxy  -     C-reactive protein  -     Vitamin B12 & Folate    2. Hair thinning  -     CBC & Differential  -     Comprehensive Metabolic Panel  -     Lipid Panel With LDL / HDL Ratio  -     TSH  -     Urinalysis With Microscopic If Indicated (No Culture) - Urine, Clean Catch  -     Ferritin  -     DYLON With / DsDNA, RNP, Sjogrens A / B, Vazquez  -     Vitamin D 25 Hydroxy  -     C-reactive protein  -     Vitamin B12 & Folate    3. Essential hypertension  -     CBC & Differential  -     Comprehensive Metabolic Panel  -     Lipid Panel With LDL / HDL Ratio  -     TSH  -      Urinalysis With Microscopic If Indicated (No Culture) - Urine, Clean Catch  -     Ferritin  -     DYLON With / DsDNA, RNP, Sjogrens A / B, Vazquez  -     Vitamin D 25 Hydroxy  -     C-reactive protein  -     Vitamin B12 & Folate    4. Acute midline low back pain with bilateral sciatica  Comments:  3 weeks not severe increase in the morning without fever or associated symptoms no history of malignancy  Orders:  -     CBC & Differential  -     Comprehensive Metabolic Panel  -     Lipid Panel With LDL / HDL Ratio  -     TSH  -     Urinalysis With Microscopic If Indicated (No Culture) - Urine, Clean Catch  -     Ferritin  -     DYLON With / DsDNA, RNP, Sjogrens A / B, Vazquez  -     Vitamin D 25 Hydroxy  -     C-reactive protein  -     Vitamin B12 & Folate  -     XR Spine Lumbar 2 or 3 View    5. Osteoporosis, unspecified osteoporosis type, unspecified pathological fracture presence  -     CBC & Differential  -     Comprehensive Metabolic Panel  -     Lipid Panel With LDL / HDL Ratio  -     TSH  -     Urinalysis With Microscopic If Indicated (No Culture) - Urine, Clean Catch  -     Ferritin  -     DYLON With / DsDNA, RNP, Sjogrens A / B, Vazquez  -     Vitamin D 25 Hydroxy  -     C-reactive protein  -     Vitamin B12 & Folate    6. Health maintenance examination  -     CBC & Differential  -     Comprehensive Metabolic Panel  -     Lipid Panel With LDL / HDL Ratio  -     TSH  -     Urinalysis With Microscopic If Indicated (No Culture) - Urine, Clean Catch  -     Ferritin  -     DYLON With / DsDNA, RNP, Sjogrens A / B, Vazquez  -     Vitamin D 25 Hydroxy  -     C-reactive protein  -     Vitamin B12 & Folate    7. Acquired hypothyroidism  -     CBC & Differential  -     Comprehensive Metabolic Panel  -     Lipid Panel With LDL / HDL Ratio  -     TSH  -     Urinalysis With Microscopic If Indicated (No Culture) - Urine, Clean Catch  -     Ferritin  -     DYLON With / DsDNA, RNP, Sjogrens A / B, Vazquez  -     Vitamin D 25 Hydroxy  -      C-reactive protein  -     Vitamin B12 & Folate    Other orders  -     escitalopram (LEXAPRO) 10 MG tablet; Take 1 tablet by mouth Daily.  Dispense: 90 tablet; Refill: 1      I spent 30  minutes caring for Lissy on this date of service. This time includes time spent by me in the following activities:preparing for the visit, obtaining and/or reviewing a separately obtained history, performing a medically appropriate examination and/or evaluation , counseling and educating the patient/family/caregiver, ordering medications, tests, or procedures, referring and communicating with other health care professionals , documenting information in the medical record, independently interpreting results and communicating that information with the patient/family/caregiver and care coordination  Follow Up   Return in about 6 months (around 6/10/2022), or X-rays today, print lab orders, follow-up 6 months fasting labs prior please.  Patient was given instructions and counseling regarding her condition or for health maintenance advice. Please see specific information pulled into the AVS if appropriate.   Discharge instructions    Walking for healthy bones, we will see where your calcium as needed guidance for osteoporosis you may be a candidate for Prolia?    DEXA scan every 2 years,    We probably should see a dermatologist for your hair but for now let us see what your labs are    Your left middle finger sounds like it could be a trigger finger or a small cyst sometimes on the extensor Aflaxen tendons although were not sure here so when you are ready see hand specialist    Exercises daily for your back, work around the pain, if is not improving after 6 weeks of exercise see me a message at that time he probably should see a orthopedist spine specialist to work-up your back in [place] as you will need an MRI      Tylenol 650 extra strength twice a day if need be for discomfort  We may need to try you with a steroid pack if your  symptoms increased if you have severe pain fever chills weakness bowel or bladder incontinence or retention saddlebag paresthesias emergency room if your pain is not improving as above we will get MRI,      Should you get Covid symptoms or viral symptoms check early and repeat test due to potential for false negatives so I can offer you antibodies and possibly antivirals before you feel too sick

## 2021-12-12 LAB
BACTERIA UR CULT: ABNORMAL
BACTERIA UR CULT: ABNORMAL

## 2021-12-13 ENCOUNTER — TELEPHONE (OUTPATIENT)
Dept: FAMILY MEDICINE CLINIC | Facility: CLINIC | Age: 66
End: 2021-12-13

## 2021-12-13 RX ORDER — CEPHALEXIN 500 MG/1
500 CAPSULE ORAL 3 TIMES DAILY
Qty: 15 CAPSULE | Refills: 0 | Status: SHIPPED | OUTPATIENT
Start: 2021-12-13 | End: 2021-12-18

## 2021-12-17 ENCOUNTER — TELEPHONE (OUTPATIENT)
Dept: FAMILY MEDICINE CLINIC | Facility: CLINIC | Age: 66
End: 2021-12-17

## 2021-12-17 NOTE — TELEPHONE ENCOUNTER
Caller: Lissy Fernandez    Relationship: Self    Best call back number: [phone]     Caller requesting test results: YES    What test was performed: LAB WORK / XRAY    When was the test performed: 12/10/21    Where was the test performed: N/A

## 2021-12-17 NOTE — TELEPHONE ENCOUNTER
PATIENT CALLED TO CHECK THE STATUS OF THIS REQUEST.    PATIENT IS MAINLY WANTING THE UA RESULTS PRIOR TO THE WEEKEND.    PLEASE ADVISE  [phone]

## 2021-12-21 NOTE — TELEPHONE ENCOUNTER
Patient has been advised the office has not received x-rays and she is going to call and ask that they be faxed.

## 2022-01-11 ENCOUNTER — TELEPHONE (OUTPATIENT)
Dept: FAMILY MEDICINE CLINIC | Facility: CLINIC | Age: 67
End: 2022-01-11

## 2022-01-11 NOTE — TELEPHONE ENCOUNTER
Caller: Lissy Fernandez    Relationship: Self    Best call back number: [phone]    Who are you requesting to speak with (clinical staff, provider,  specific staff member): JAMES EPLEY    What was the call regarding: PATIENT CALLED TODAY FRUSTRATED THAT SHE HAS NOT HEARD FROM ANYONE REGARDING HER TEST RESULTS SHE HAD LAST MONTH. SHE HAS CALLED MULTIPLE TIMES TO GET THESE RESULTS AND HAS NEVER HEARD BACK.     PLEASE CALL HER BACK TODAY TO GO OVER THE RESULTS. ATTEMPTED WARM TRANSFER TWICE, NO ANSWER.     Do you require a callback: YES

## 2022-02-09 ENCOUNTER — TELEPHONE (OUTPATIENT)
Dept: FAMILY MEDICINE CLINIC | Facility: CLINIC | Age: 67
End: 2022-02-09

## 2022-02-09 RX ORDER — LISINOPRIL 2.5 MG/1
2.5 TABLET ORAL DAILY
Qty: 90 TABLET | Refills: 1 | Status: SHIPPED | OUTPATIENT
Start: 2022-02-09 | End: 2022-02-14 | Stop reason: SDUPTHER

## 2022-02-09 NOTE — TELEPHONE ENCOUNTER
Caller: Lissy Fernandez    Relationship: Self    Best call back number: [phone]    Requested Prescriptions:   Requested Prescriptions     Pending Prescriptions Disp Refills   • lisinopril (Zestril) 2.5 MG tablet 30 tablet 5     Sig: Take 1 tablet by mouth Daily. For hypertension        Pharmacy where request should be sent: [facility] [address] [place], [place] [id number] [facility] - [phone]  - [phone] FX     Additional details provided by patient: PATIENT WOULD LIKE TO HAVE THIS INCREASED BACK TO THE 5 MG   AND SHE WOULD LIKE TO SEE ABOUT GETTING A WATER PILL     Does the patient have less than a 3 day supply:  [x] Yes  [] No    Lidia Mckeon Rep   02/09/22 16:00 EST

## 2022-02-09 NOTE — TELEPHONE ENCOUNTER
Rx Refill Note  Requested Prescriptions     Pending Prescriptions Disp Refills   • lisinopril (Zestril) 2.5 MG tablet 30 tablet 5     Sig: Take 1 tablet by mouth Daily. For hypertension      Last office visit with prescribing clinician: 12/10/2021      Next office visit with prescribing clinician: 6/10/2022            Lidia Freeman Rep  02/09/22, 16:09 EST

## 2022-02-14 ENCOUNTER — TELEPHONE (OUTPATIENT)
Dept: FAMILY MEDICINE CLINIC | Facility: CLINIC | Age: 67
End: 2022-02-14

## 2022-02-14 RX ORDER — HYDROCHLOROTHIAZIDE 12.5 MG/1
12.5 CAPSULE, GELATIN COATED ORAL DAILY
Qty: 90 CAPSULE | Refills: 2 | Status: SHIPPED | OUTPATIENT
Start: 2022-02-14

## 2022-02-14 RX ORDER — LISINOPRIL 2.5 MG/1
2.5 TABLET ORAL DAILY
Qty: 90 TABLET | Refills: 2 | Status: SHIPPED | OUTPATIENT
Start: 2022-02-14

## 2022-02-14 NOTE — TELEPHONE ENCOUNTER
Caller: Lissy Fernandez    Relationship: Self    Best call back number:  [phone]    Requested Prescriptions:   Requested Prescriptions     Pending Prescriptions Disp Refills   • hydroCHLOROthiazide (MICROZIDE) 12.5 MG capsule       Sig: Take 1 capsule by mouth Daily.      lisinopril (Zestril) 2.5 MG tablet  - PATIENT IS REQUESTING INCREASE IN DOSAGE TO 5 MG   Pharmacy where request should be sent: [address] [address] [facility] - [phone] [facility] [phone]      Additional details provided by patient: PATIENT IS OUT OF MEDICATION AND IS REQUESTING THE TABLET FORM FOR THIS MEDICATION , PATIENT WOULD LIKE TO INCREASE HER DOSAGE ON THE LISINOPRIL BACK TO 5 MG     Does the patient have less than a 3 day supply:  [x] Yes  [] No    Lidia Ignacio Rep   02/14/22 08:45 EST

## 2022-02-14 NOTE — TELEPHONE ENCOUNTER
Rx Refill Note  Requested Prescriptions     Pending Prescriptions Disp Refills   • hydroCHLOROthiazide (MICROZIDE) 12.5 MG capsule       Sig: Take 1 capsule by mouth Daily.   • lisinopril (Zestril) 2.5 MG tablet 90 tablet 1     Sig: Take 1 tablet by mouth Daily. For hypertension      Last office visit with prescribing clinician: 12/10/2021      Next office visit with prescribing clinician: 6/10/2022            Lidia Freeman Rep  02/14/22, 09:31 EST

## 2023-05-08 ENCOUNTER — OFFICE VISIT (OUTPATIENT)
Dept: OBSTETRICS AND GYNECOLOGY | Facility: CLINIC | Age: 68
End: 2023-05-08
Payer: MEDICARE

## 2023-05-08 VITALS
BODY MASS INDEX: 22.82 KG/M2 | SYSTOLIC BLOOD PRESSURE: 128 MMHG | HEIGHT: 62 IN | DIASTOLIC BLOOD PRESSURE: 70 MMHG | WEIGHT: 124 LBS

## 2023-05-08 DIAGNOSIS — Z86.03 HISTORY OF NEOPLASM OF OVARY WITH LOW MALIGNANT POTENTIAL: ICD-10-CM

## 2023-05-08 DIAGNOSIS — C56.3 MALIGNANT NEOPLASM OF BILATERAL OVARIES: ICD-10-CM

## 2023-05-08 DIAGNOSIS — Z12.11 COLON CANCER SCREENING: Primary | ICD-10-CM

## 2023-05-08 DIAGNOSIS — Z01.419 ENCOUNTER FOR ROUTINE GYNECOLOGIC EXAMINATION IN MEDICARE PATIENT: ICD-10-CM

## 2023-05-08 DIAGNOSIS — Z78.0 POSTMENOPAUSAL: ICD-10-CM

## 2023-05-08 RX ORDER — LORAZEPAM 1 MG/1
TABLET ORAL
COMMUNITY
Start: 2023-02-27

## 2023-05-08 RX ORDER — LISINOPRIL 5 MG/1
5 TABLET ORAL DAILY
COMMUNITY

## 2023-05-08 RX ORDER — LEVOTHYROXINE SODIUM 0.03 MG/1
25 TABLET ORAL
COMMUNITY
Start: 2022-06-06 | End: 2023-06-07

## 2023-05-08 NOTE — PROGRESS NOTES
GYN Annual Exam     CC- Here for annual exam.     Lissy Fernandez is a 67 y.o. female who presents for annual well woman exam. She is menopausal.  She is experiencing and/or reports no new or bothersome menopausal symptoms.  She denies postmenopausal vaginal bleeding.  She denies abdominal pain, diarrhea and cough.  Today, she wishes to specifically address just routine screening exam..  I explained to her that current ACOG guidelines state that screening Pap smears are no longer recommended after the age of 65, nor after hysterectomy for benign reasons.  She has a history of low malignant potential ovarian tumors removed in .  Her most recent  was in  and it was 9.    OB History        2    Para   2    Term                AB        Living           SAB        IAB        Ectopic        Molar        Multiple        Live Births                    Current contraception: post menopausal status  History of abnormal Pap smear: no  Family history of uterine, colon or ovarian cancer: no  History of abnormal mammogram: no  Family history of breast cancer: Yes, her sister.  Last Pap :   Last mammogram: 2022  Last colonoscopy: Up-to-date  Last DEXA: Replacing in order to be done in [facility]      Past Medical History:   Diagnosis Date   • Abnormal Pap smear of cervix    • Cervical dysplasia    • HTN (hypertension)    • Hypothyroidism    • Melanoma        Past Surgical History:   Procedure Laterality Date   • BILATERAL OOPHORECTOMY     • CHOLECYSTECTOMY     • WISDOM TOOTH EXTRACTION           Current Outpatient Medications:   •  fluticasone (Flonase) 50 MCG/ACT nasal spray, 2 sprays into the nostril(s) as directed by provider Daily. May substitute any covered thank you, Disp: 18.2 mL, Rfl: 11  •  hydroCHLOROthiazide (MICROZIDE) 12.5 MG capsule, Take 1 capsule by mouth Daily. (Patient taking differently: Take 1 capsule by mouth Daily. prn), Disp: 90 capsule, Rfl: 2  •   levothyroxine (SYNTHROID, LEVOTHROID) 25 MCG tablet, Take 1 tablet by mouth Daily., Disp: , Rfl:   •  lisinopril (PRINIVIL,ZESTRIL) 5 MG tablet, Take 1 tablet by mouth Daily., Disp: , Rfl:   •  LORazepam (ATIVAN) 1 MG tablet, TAKE 1 TABLET BY MOUTH TWICE DAILY AS NEEDED FOR VERTIGO, Disp: , Rfl:   •  multivitamin with minerals tablet tablet, Take 1 tablet by mouth Daily., Disp: , Rfl:   •  Euthyrox 25 MCG tablet, Take 25 mcg by mouth Every Morning. (Patient not taking: Reported on 5/8/2023), Disp: , Rfl:   •  ipratropium (ATROVENT) 0.06 % nasal spray, 1 spray As Needed. (Patient not taking: Reported on 5/8/2023), Disp: , Rfl:   •  levothyroxine (SYNTHROID, LEVOTHROID) 25 MCG tablet, Take 1 tablet by mouth. (Patient not taking: Reported on 5/8/2023), Disp: , Rfl:   •  liothyronine (CYTOMEL) 5 MCG tablet, Take 5 mcg by mouth Daily. (Patient not taking: Reported on 5/8/2023), Disp: , Rfl:   •  lisinopril (Zestril) 2.5 MG tablet, Take 1 tablet by mouth Daily. For hypertension (Patient not taking: Reported on 5/8/2023), Disp: 90 tablet, Rfl: 2    Allergies   Allergen Reactions   • Amoxicillin-Pot Clavulanate Diarrhea and Nausea And Vomiting   • Azithromycin Diarrhea and GI Intolerance   • Levofloxacin Diarrhea, Nausea And Vomiting and GI Intolerance   • Metronidazole GI Intolerance   • Contrast Dye (Echo Or Unknown Ct/Mr) Palpitations       Social History     Tobacco Use   • Smoking status: Never   • Smokeless tobacco: Never   Vaping Use   • Vaping Use: Never used   Substance Use Topics   • Alcohol use: Never   • Drug use: Never       Family History   Problem Relation Age of Onset   • Hypertension Father    • Breast cancer Sister    • Breast cancer Other    • Colon cancer Neg Hx    • Ovarian cancer Neg Hx    • Uterine cancer Neg Hx        Review of Systems   Constitutional: Negative for fatigue and fever.   Gastrointestinal: Negative for abdominal pain.   Genitourinary: Negative for pelvic pain and vaginal bleeding.  "  Patient reports that she is not currently experiencing any symptoms of urinary incontinence.      /70   Ht 157.5 cm (62.01\")   Wt 56.2 kg (124 lb)   LMP  (LMP Unknown)   BMI 22.67 kg/m²     Physical Exam  Constitutional:       Appearance: She is normal weight.   Genitourinary:      Bladder and urethral meatus normal.      No lesions in the vagina.      Right Labia: No lesions.     Left Labia: No lesions.     No vaginal tenderness or bleeding.      No vaginal prolapse present.     No vaginal atrophy present.       Right Adnexa: not tender, not full and no mass present.     Left Adnexa: not tender, not full and no mass present.     No cervical motion tenderness, discharge, friability or lesion.      Uterus is not enlarged, fixed or tender.      No uterine mass detected.     Uterus is midaxial.   Breasts:     Right: No mass, nipple discharge, skin change or tenderness.      Left: No mass, nipple discharge, skin change or tenderness.   Neck:      Thyroid: No thyroid mass or thyromegaly.   Abdominal:      General: Abdomen is flat.      Palpations: Abdomen is soft. There is no mass.      Tenderness: There is no abdominal tenderness.   Neurological:      Mental Status: She is alert.   Vitals reviewed.             Assessment     1) GYN annual well woman exam.   2) history of borderline tumor of the ovaries.  We will get  today.     Plan     1) Breast Health - Clinical breast exam & mammogram reviewed specifically American Cancer Society recommendations for screening specific to her, and Self breast awareness monthly  2) Pap -not indicated at her age  3) Smoking status-negative  4) Colon health - screening colonoscopy recommended if not up to date  5) Bone health - Weight bearing exercise, dietary calcium recommendations and vitamin D reviewed.   6) Encouraged to be wary of information obtained via social media and internet based on source and search.   7) Follow up prn and one year      Allan Lopez MD "   5/8/2023  14:15 EDT

## 2023-05-09 LAB — CANCER AG125 SERPL-ACNC: 14.4 U/ML (ref 0–38.1)

## 2023-05-11 ENCOUNTER — TELEPHONE (OUTPATIENT)
Dept: OBSTETRICS AND GYNECOLOGY | Facility: CLINIC | Age: 68
End: 2023-05-11
Payer: MEDICARE

## 2023-05-11 NOTE — TELEPHONE ENCOUNTER
Patient is wanting her dexa scan to be done at [facility] if possible, not [facility], thanks!

## 2023-05-23 ENCOUNTER — TELEPHONE (OUTPATIENT)
Dept: OBSTETRICS AND GYNECOLOGY | Facility: CLINIC | Age: 68
End: 2023-05-23
Payer: MEDICARE

## 2023-05-23 DIAGNOSIS — N95.1 MENOPAUSAL SYMPTOMS: Primary | ICD-10-CM

## 2023-05-23 NOTE — TELEPHONE ENCOUNTER
Pt aware of cologuard results. She states recently at PCP a blood work up to check her hormone levels was recommended, pt advised to check with our office. Pt says she was recently at ED due to cluster headaches and was told this could be due to a decrease in hormones. Is pt okay to come in for blood work to check levels?    Please advise,   Thank you

## 2024-08-26 ENCOUNTER — TELEPHONE (OUTPATIENT)
Dept: OBSTETRICS AND GYNECOLOGY | Facility: CLINIC | Age: 69
End: 2024-08-26
Payer: MEDICARE

## 2024-08-26 NOTE — TELEPHONE ENCOUNTER
CRISSY CORNELIO     [phone]    PT IS SCHEDULED 9/4/24 HOR HRT. SHE WOULD LIKE TO DO A TELEHEALTH, STATES SHE WAS JUST SEEN HERE RECENTLY (5/8/24)    WAS ADVISED SHE MIGHT NEED LABWORK, PT STATES SHE ALREADY HAD LABS.    PT LIVES 2hrs AWAY    PLEASE ADVISE

## 2024-09-04 ENCOUNTER — OFFICE VISIT (OUTPATIENT)
Dept: OBSTETRICS AND GYNECOLOGY | Facility: CLINIC | Age: 69
End: 2024-09-04
Payer: MEDICARE

## 2024-09-04 VITALS
BODY MASS INDEX: 23.96 KG/M2 | HEIGHT: 62 IN | HEART RATE: 72 BPM | WEIGHT: 130.2 LBS | DIASTOLIC BLOOD PRESSURE: 74 MMHG | SYSTOLIC BLOOD PRESSURE: 114 MMHG

## 2024-09-04 DIAGNOSIS — N95.1 MENOPAUSAL SYMPTOMS: Primary | ICD-10-CM

## 2024-09-04 PROCEDURE — 1160F RVW MEDS BY RX/DR IN RCRD: CPT | Performed by: OBSTETRICS & GYNECOLOGY

## 2024-09-04 PROCEDURE — 99213 OFFICE O/P EST LOW 20 MIN: CPT | Performed by: OBSTETRICS & GYNECOLOGY

## 2024-09-04 PROCEDURE — 3074F SYST BP LT 130 MM HG: CPT | Performed by: OBSTETRICS & GYNECOLOGY

## 2024-09-04 PROCEDURE — 3078F DIAST BP <80 MM HG: CPT | Performed by: OBSTETRICS & GYNECOLOGY

## 2024-09-04 PROCEDURE — 1159F MED LIST DOCD IN RCRD: CPT | Performed by: OBSTETRICS & GYNECOLOGY

## 2024-09-04 NOTE — PROGRESS NOTES
REASON FOR FOLLOWUP/CHIEF COMPLAINT:  (Patient is here to discuss hormone replacement. She has some questions )      HISTORY OF PRESENT ILLNESS: Patient has seen an outside provider who does formulated hormone replacement therapy and comes to get my opinion on that.  This provider has done multiple lab tests show, predictably, low estradiol and progesterone levels.  She states that the provider feels that she would benefit from initiating hormone replacement therapy in the form of an estrogen product as well as a progestin.  I discussed with her the general risks of combined hormone replacement therapy related mainly to coronary heart disease, breast cancer stroke and thromboembolic events.  I also discussed whether the risk versus benefit equation was favorable given that she is about 22 years postmenopausal and is age 68.  One of her lab reports that she showed me showed an elevated cholesterol level.  Review of her medication list show her also to be on lisinopril.  I told her that the risks would be mainly in the cardiovascular arena of starting them but that that is ultimately her decision in her decision with her provider.  I did encourage her to discuss with her primary care provider given her hypertension and the elevated cholesterol before starting anything.        Past Medical History, Past Surgical History, Social History, Family History have been reviewed and are without significant changes except as mentioned.    Review of Systems   Review of Systems    Medications:  The current medication list was reviewed in the EMR    ALLERGIES:    Allergies   Allergen Reactions    Amoxicillin-Pot Clavulanate Diarrhea and Nausea And Vomiting    Azithromycin Diarrhea and GI Intolerance    Levofloxacin Diarrhea, Nausea And Vomiting and GI Intolerance    Metronidazole GI Intolerance    Contrast Dye (Echo Or Unknown Ct/Mr) Palpitations              Vitals:    09/04/24 1441   BP: 114/74   Pulse: 72   Weight: 59.1  "kg (130 lb 3.2 oz)   Height: 157.5 cm (62.01\")     Physical Exam    CONSTITUTIONAL:  Vital signs reviewed.  No distress, looks comfortable.    PSYCHIATRIC:  Normal judgment and insight.  Normal mood and affect.       RECENT LABS:  WBC   Date Value Ref Range Status   06/15/2021 6.64 3.40 - 10.80 10*3/mm3 Final     Hemoglobin   Date Value Ref Range Status   06/15/2021 13.9 12.0 - 15.9 g/dL Final     Platelets   Date Value Ref Range Status   06/15/2021 285 140 - 450 10*3/mm3 Final       ASSESSMENT/PLAN:  Lissy Peraleskatherine Jim [unfilled]   1.  Menopausal symptoms.  Patient sought second opinion regarding initiating hormone replacement therapy.  I told her that I would not normally provide that to a patient this far out from menopause at her age.  She will consider her options and I did urge her to discuss with her primary care provider her other cardiovascular risk factors before starting hormone replacement therapy.  "